# Patient Record
Sex: MALE | Race: WHITE | NOT HISPANIC OR LATINO | Employment: OTHER | URBAN - NONMETROPOLITAN AREA
[De-identification: names, ages, dates, MRNs, and addresses within clinical notes are randomized per-mention and may not be internally consistent; named-entity substitution may affect disease eponyms.]

---

## 2024-03-21 ENCOUNTER — PREP FOR PROCEDURE (OUTPATIENT)
Dept: OPERATING ROOM | Facility: HOSPITAL | Age: 64
End: 2024-03-21
Payer: MEDICARE

## 2024-03-21 DIAGNOSIS — H25.812 COMBINED FORMS OF AGE-RELATED CATARACT OF LEFT EYE: Primary | ICD-10-CM

## 2024-03-21 RX ORDER — KETOROLAC TROMETHAMINE 5 MG/ML
1 SOLUTION OPHTHALMIC
Status: CANCELLED | OUTPATIENT
Start: 2024-03-28 | End: 2024-03-28

## 2024-03-21 RX ORDER — TETRACAINE HYDROCHLORIDE 5 MG/ML
1 SOLUTION OPHTHALMIC ONCE
Status: CANCELLED | OUTPATIENT
Start: 2024-03-28 | End: 2024-03-21

## 2024-03-21 RX ORDER — POVIDONE-IODINE 5 %
SOLUTION, NON-ORAL OPHTHALMIC (EYE) ONCE
Status: CANCELLED | OUTPATIENT
Start: 2024-03-28 | End: 2024-03-21

## 2024-03-21 RX ORDER — PREDNISOLONE ACETATE 10 MG/ML
1 SUSPENSION/ DROPS OPHTHALMIC ONCE
Status: CANCELLED | OUTPATIENT
Start: 2024-03-28 | End: 2024-03-21

## 2024-03-26 RX ORDER — METHOTREXATE 2.5 MG/1
6 TABLET ORAL
COMMUNITY

## 2024-03-26 RX ORDER — ATORVASTATIN CALCIUM 40 MG/1
40 TABLET, FILM COATED ORAL DAILY
COMMUNITY

## 2024-03-26 RX ORDER — CILOSTAZOL 100 MG/1
100 TABLET ORAL 2 TIMES DAILY
COMMUNITY

## 2024-03-26 RX ORDER — GOLDENSEAL 350 MG
CAPSULE ORAL
COMMUNITY

## 2024-03-26 RX ORDER — ASPIRIN 81 MG/1
81 TABLET ORAL DAILY
COMMUNITY

## 2024-03-26 RX ORDER — LISINOPRIL AND HYDROCHLOROTHIAZIDE 12.5; 2 MG/1; MG/1
1 TABLET ORAL DAILY
COMMUNITY

## 2024-03-26 RX ORDER — FERROUS SULFATE 325(65) MG
65 TABLET, DELAYED RELEASE (ENTERIC COATED) ORAL
COMMUNITY

## 2024-03-26 RX ORDER — FOLIC ACID 1 MG/1
1 TABLET ORAL DAILY
COMMUNITY

## 2024-03-26 RX ORDER — TRAMADOL HYDROCHLORIDE 50 MG/1
50 TABLET ORAL 2 TIMES DAILY PRN
COMMUNITY

## 2024-03-26 NOTE — PREPROCEDURE INSTRUCTIONS
No outpatient medications have been marked as taking for the 3/28/24 encounter (Hospital Encounter).                       NPO Instructions:    Nothing to eat or drink after midnight    Additional Instructions:     Will need  home, will receive call day before surgery with arrival time

## 2024-03-28 ENCOUNTER — ANESTHESIA (OUTPATIENT)
Dept: OPERATING ROOM | Facility: HOSPITAL | Age: 64
End: 2024-03-28
Payer: MEDICARE

## 2024-03-28 ENCOUNTER — ANESTHESIA EVENT (OUTPATIENT)
Dept: OPERATING ROOM | Facility: HOSPITAL | Age: 64
End: 2024-03-28
Payer: MEDICARE

## 2024-03-28 ENCOUNTER — HOSPITAL ENCOUNTER (OUTPATIENT)
Facility: HOSPITAL | Age: 64
Setting detail: OUTPATIENT SURGERY
Discharge: HOME | End: 2024-03-28
Attending: OPHTHALMOLOGY | Admitting: OPHTHALMOLOGY
Payer: MEDICARE

## 2024-03-28 VITALS
OXYGEN SATURATION: 100 % | RESPIRATION RATE: 18 BRPM | SYSTOLIC BLOOD PRESSURE: 129 MMHG | WEIGHT: 156.09 LBS | HEART RATE: 66 BPM | HEIGHT: 69 IN | DIASTOLIC BLOOD PRESSURE: 80 MMHG | BODY MASS INDEX: 23.12 KG/M2 | TEMPERATURE: 97.6 F

## 2024-03-28 PROCEDURE — 2500000004 HC RX 250 GENERAL PHARMACY W/ HCPCS (ALT 636 FOR OP/ED): Performed by: ANESTHESIOLOGY

## 2024-03-28 PROCEDURE — 2500000005 HC RX 250 GENERAL PHARMACY W/O HCPCS: Performed by: OPHTHALMOLOGY

## 2024-03-28 PROCEDURE — 7100000010 HC PHASE TWO TIME - EACH INCREMENTAL 1 MINUTE: Performed by: OPHTHALMOLOGY

## 2024-03-28 PROCEDURE — 3600000008 HC OR TIME - EACH INCREMENTAL 1 MINUTE - PROCEDURE LEVEL THREE: Performed by: OPHTHALMOLOGY

## 2024-03-28 PROCEDURE — 2500000001 HC RX 250 WO HCPCS SELF ADMINISTERED DRUGS (ALT 637 FOR MEDICARE OP): Performed by: OPHTHALMOLOGY

## 2024-03-28 PROCEDURE — 2760000001 HC OR 276 NO HCPCS: Performed by: OPHTHALMOLOGY

## 2024-03-28 PROCEDURE — 2500000004 HC RX 250 GENERAL PHARMACY W/ HCPCS (ALT 636 FOR OP/ED): Performed by: OPHTHALMOLOGY

## 2024-03-28 PROCEDURE — 3700000001 HC GENERAL ANESTHESIA TIME - INITIAL BASE CHARGE: Performed by: OPHTHALMOLOGY

## 2024-03-28 PROCEDURE — 3600000003 HC OR TIME - INITIAL BASE CHARGE - PROCEDURE LEVEL THREE: Performed by: OPHTHALMOLOGY

## 2024-03-28 PROCEDURE — 7100000009 HC PHASE TWO TIME - INITIAL BASE CHARGE: Performed by: OPHTHALMOLOGY

## 2024-03-28 PROCEDURE — 3700000002 HC GENERAL ANESTHESIA TIME - EACH INCREMENTAL 1 MINUTE: Performed by: OPHTHALMOLOGY

## 2024-03-28 PROCEDURE — C1780 LENS, INTRAOCULAR (NEW TECH): HCPCS | Performed by: OPHTHALMOLOGY

## 2024-03-28 PROCEDURE — 2720000007 HC OR 272 NO HCPCS: Performed by: OPHTHALMOLOGY

## 2024-03-28 DEVICE — IMPLANTABLE DEVICE: Type: IMPLANTABLE DEVICE | Site: EYE | Status: FUNCTIONAL

## 2024-03-28 RX ORDER — MIDAZOLAM HYDROCHLORIDE 1 MG/ML
2 INJECTION INTRAMUSCULAR; INTRAVENOUS ONCE AS NEEDED
Status: COMPLETED | OUTPATIENT
Start: 2024-03-28 | End: 2024-03-28

## 2024-03-28 RX ORDER — POVIDONE-IODINE 5 %
SOLUTION, NON-ORAL OPHTHALMIC (EYE) ONCE
Status: COMPLETED | OUTPATIENT
Start: 2024-03-28 | End: 2024-03-28

## 2024-03-28 RX ORDER — TETRACAINE HYDROCHLORIDE 5 MG/ML
1 SOLUTION OPHTHALMIC ONCE
Status: COMPLETED | OUTPATIENT
Start: 2024-03-28 | End: 2024-03-28

## 2024-03-28 RX ORDER — KETOROLAC TROMETHAMINE 5 MG/ML
1 SOLUTION OPHTHALMIC
Status: COMPLETED | OUTPATIENT
Start: 2024-03-28 | End: 2024-03-28

## 2024-03-28 RX ORDER — PREDNISOLONE ACETATE 10 MG/ML
1 SUSPENSION/ DROPS OPHTHALMIC ONCE
Status: COMPLETED | OUTPATIENT
Start: 2024-03-28 | End: 2024-03-28

## 2024-03-28 RX ORDER — MIDAZOLAM HYDROCHLORIDE 1 MG/ML
INJECTION INTRAMUSCULAR; INTRAVENOUS AS NEEDED
Status: DISCONTINUED | OUTPATIENT
Start: 2024-03-28 | End: 2024-03-28

## 2024-03-28 RX ORDER — SODIUM CHLORIDE, SODIUM LACTATE, POTASSIUM CHLORIDE, CALCIUM CHLORIDE 600; 310; 30; 20 MG/100ML; MG/100ML; MG/100ML; MG/100ML
100 INJECTION, SOLUTION INTRAVENOUS CONTINUOUS
Status: DISCONTINUED | OUTPATIENT
Start: 2024-03-28 | End: 2024-03-28 | Stop reason: HOSPADM

## 2024-03-28 RX ORDER — MOXIFLOXACIN 5 MG/ML
SOLUTION/ DROPS OPHTHALMIC AS NEEDED
Status: DISCONTINUED | OUTPATIENT
Start: 2024-03-28 | End: 2024-03-28 | Stop reason: HOSPADM

## 2024-03-28 RX ADMIN — PHENYLEPHRINE HYDROCHLORIDE 1 DROP: 100 SOLUTION/ DROPS OPHTHALMIC at 11:20

## 2024-03-28 RX ADMIN — POLYVINYL ALCOHOL, POVIDONE 1 DROP: 14; 6 SOLUTION/ DROPS OPHTHALMIC at 11:10

## 2024-03-28 RX ADMIN — MIDAZOLAM HYDROCHLORIDE 2 MG: 1 INJECTION, SOLUTION INTRAMUSCULAR; INTRAVENOUS at 12:39

## 2024-03-28 RX ADMIN — SODIUM CHLORIDE, POTASSIUM CHLORIDE, SODIUM LACTATE AND CALCIUM CHLORIDE 100 ML/HR: 600; 310; 30; 20 INJECTION, SOLUTION INTRAVENOUS at 11:09

## 2024-03-28 RX ADMIN — KETOROLAC TROMETHAMINE 1 DROP: 5 SOLUTION/ DROPS OPHTHALMIC at 11:37

## 2024-03-28 RX ADMIN — KETOROLAC TROMETHAMINE 1 DROP: 5 SOLUTION/ DROPS OPHTHALMIC at 11:10

## 2024-03-28 RX ADMIN — POVIDONE-IODINE: 5 SOLUTION OPHTHALMIC at 11:09

## 2024-03-28 RX ADMIN — MIDAZOLAM HYDROCHLORIDE 1 MG: 1 INJECTION, SOLUTION INTRAMUSCULAR; INTRAVENOUS at 12:48

## 2024-03-28 RX ADMIN — POLYVINYL ALCOHOL, POVIDONE 1 DROP: 14; 6 SOLUTION/ DROPS OPHTHALMIC at 11:20

## 2024-03-28 RX ADMIN — TETRACAINE HYDROCHLORIDE 1 DROP: 5 SOLUTION OPHTHALMIC at 11:08

## 2024-03-28 RX ADMIN — KETOROLAC TROMETHAMINE 1 DROP: 5 SOLUTION/ DROPS OPHTHALMIC at 11:20

## 2024-03-28 RX ADMIN — PHENYLEPHRINE HYDROCHLORIDE 1 DROP: 100 SOLUTION/ DROPS OPHTHALMIC at 11:25

## 2024-03-28 RX ADMIN — KETOROLAC TROMETHAMINE 1 DROP: 5 SOLUTION/ DROPS OPHTHALMIC at 11:26

## 2024-03-28 RX ADMIN — PHENYLEPHRINE HYDROCHLORIDE 1 DROP: 100 SOLUTION/ DROPS OPHTHALMIC at 11:10

## 2024-03-28 RX ADMIN — PHENYLEPHRINE HYDROCHLORIDE 1 DROP: 100 SOLUTION/ DROPS OPHTHALMIC at 11:37

## 2024-03-28 RX ADMIN — MIDAZOLAM HYDROCHLORIDE 1 MG: 1 INJECTION, SOLUTION INTRAMUSCULAR; INTRAVENOUS at 12:55

## 2024-03-28 RX ADMIN — POLYVINYL ALCOHOL, POVIDONE 1 DROP: 14; 6 SOLUTION/ DROPS OPHTHALMIC at 11:37

## 2024-03-28 RX ADMIN — POLYVINYL ALCOHOL, POVIDONE 1 DROP: 14; 6 SOLUTION/ DROPS OPHTHALMIC at 11:26

## 2024-03-28 SDOH — HEALTH STABILITY: MENTAL HEALTH: CURRENT SMOKER: 0

## 2024-03-28 ASSESSMENT — PAIN - FUNCTIONAL ASSESSMENT
PAIN_FUNCTIONAL_ASSESSMENT: 0-10

## 2024-03-28 ASSESSMENT — PAIN SCALES - GENERAL
PAIN_LEVEL: 0
PAINLEVEL_OUTOF10: 0 - NO PAIN
PAINLEVEL_OUTOF10: 5 - MODERATE PAIN
PAINLEVEL_OUTOF10: 0 - NO PAIN

## 2024-03-28 ASSESSMENT — COLUMBIA-SUICIDE SEVERITY RATING SCALE - C-SSRS
6. HAVE YOU EVER DONE ANYTHING, STARTED TO DO ANYTHING, OR PREPARED TO DO ANYTHING TO END YOUR LIFE?: NO
1. IN THE PAST MONTH, HAVE YOU WISHED YOU WERE DEAD OR WISHED YOU COULD GO TO SLEEP AND NOT WAKE UP?: NO
2. HAVE YOU ACTUALLY HAD ANY THOUGHTS OF KILLING YOURSELF?: NO

## 2024-03-28 NOTE — ANESTHESIA PREPROCEDURE EVALUATION
Patient: Edgar Asif    Procedure Information       Date/Time: 03/28/24 1130    Procedure: Phacoemulsification Cataract with Insertion Intraocular Lens (Left: Eye)    Location: [facility] OR 05 / [facility] OR    Surgeons: Jhon López MD            Relevant Problems   Anesthesia (within normal limits)   (-) Difficult intubation   (-) Malignant hyperthermia   (-) PONV (postoperative nausea and vomiting)      Cardiac (within normal limits)      Pulmonary (within normal limits)      Neuro (within normal limits)      GI (within normal limits)      /Renal (within normal limits)      Liver (within normal limits)      Endocrine (within normal limits)      Hematology (within normal limits)      Musculoskeletal (within normal limits)      HEENT (within normal limits)      ID (within normal limits)      Skin (within normal limits)      GYN (within normal limits)       Clinical information reviewed:   Tobacco  Allergies  Meds   Med Hx  Surg Hx   Fam Hx  Soc Hx        NPO Detail:  NPO/Void Status  Carbohydrate Drink Given Prior to Surgery? : N  Date of Last Liquid: 03/27/24  Time of Last Liquid: 2200  Date of Last Solid: 03/27/24  Time of Last Solid: 1800  Last Intake Type: Clear fluids  Time of Last Void: 1047         Physical Exam    Airway  Mallampati: II  TM distance: >3 FB  Neck ROM: full     Cardiovascular   Rhythm: regular  Rate: normal     Dental - normal exam  (+) upper dentures     Pulmonary   Breath sounds clear to auscultation     Abdominal      Other findings: Lower remaining teeth intact          Anesthesia Plan    History of general anesthesia?: yes  History of complications of general anesthesia?: no    ASA 3     MAC     The patient is not a current smoker.    intravenous induction   Anesthetic plan and risks discussed with patient.

## 2024-03-28 NOTE — ANESTHESIA POSTPROCEDURE EVALUATION
Patient: Edgar Asif    Procedure Summary       Date: 03/28/24 Room / Location: [facility] OR 05 / [facility] OR    Anesthesia Start: 1240 Anesthesia Stop:     Procedure: Phacoemulsification Cataract with Insertion Intraocular Lens (Left: Eye) Diagnosis:       Combined forms of age-related cataract of left eye      (Combined forms of age-related cataract of left eye [H25.812])    Surgeons: Jhon López MD Responsible Provider: Hao Youssef MD    Anesthesia Type: MAC ASA Status: 3            Anesthesia Type: MAC    Vitals Value Taken Time   BP  03/28/24 1259   Temp  03/28/24 1259   Pulse 68 03/28/24 1259   Resp 12 03/28/24 1259   SpO2 99 03/28/24 1259       Anesthesia Post Evaluation    Patient location during evaluation: PACU  Patient participation: complete - patient participated  Level of consciousness: awake  Pain score: 0  Pain management: adequate  Airway patency: patent  Cardiovascular status: acceptable  Respiratory status: acceptable  Hydration status: acceptable  Postoperative Nausea and Vomiting: none        There were no known notable events for this encounter.

## 2024-03-28 NOTE — H&P
H&P Notes  - documented in this encounter  Jhon López MD - 03/22/2024 4:18 PM EDT  Formatting of this note is different from the original.  HISTORY AND PHYSICAL EXAMINATION    SERVICE DATE: 3/22/2024  SERVICE TIME:    PRIMARY CARE PHYSICIAN: No primary care provider on file.    REASON FOR VISIT:  Edgar Asif is a 64 year old male who is being seen for combined age related cataract left eye    The patient has the following:  ACTIVE PROBLEM LIST  Normochromic Normocytic Anemia  Thrombocytosis  Unintentional Weight Loss  Severe Protein-Calorie Malnutrition (Hcc)  Bilateral Carotid Artery Stenosis  Cva (Cerebral Vascular Accident) (Hcc)  Degenerative Joint Disease of Low Back  Pad (Peripheral Artery Disease) (Hcc)  Occlusion of Right Iliac Artery (Hcc)  Vision Changes  Rheumatoid Arthritis Involving Multiple Sites With Positive Rheumatoid Factor (Hcc)  Cavitary Lesion of Lung  Persistent Proteinuria  Peripheral Arterial Disease (Hcc)  Adenopathy  Abnormal Mri of The Head  Pachymeningitis  Lupus Anticoagulant Positive  Vasculitis (Hcc)  Igg4 Related Disease (Hcc)  Lung Nodule  Peripheral Ulcerative Keratitis of Both Eyes  Combined Forms of Age-Related Cataract of Right Eye  Combined Form of Age-Related Cataract, Left Eye   (Central Serous Retinopathy), Bilateral  Essential Hypertension, Benign  Punctate Keratitis, Bilateral  Dry Eyes, Bilateral  Lymphadenopathy    SUBJECTIVE  CHIEF COMPLAINT: Combined age related cataract left eye  HPI: Blurry vision, difficulty reading, difficulty watching television both eyes    PAST MEDICAL HISTORY  Diagnosis Date  Essential hypertension, benign  History of anxiety  History of bladder cancer  IgG4 related disease (HCC)  IgG4-related pachymeningitis (HCC)  LLL Cavitary lung nodule  Mixed hyperlipidemia  Nuclear sclerosis of both eyes  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  Seropositive erosive rheumatoid arthritis    PAST SURGICAL HISTORY  Procedure Laterality Date  BLADDER SURGERY  HX   LUMBAR PUNCTURE PROCEDURE (W NOTE) 2023    FAMILY HISTORY  Problem Relation Age of Onset  Heart disease Father  Heart disease Mother  Hypertension Mother  other (rheumatoid arthritis) Mother  other (low back pain) Sister  Arthritis Sister  other (drug use) Son  Cataract No Family History  Glaucoma No Family History  Detached Retina No Family History  Macular Degen No Family History  Blindness No Family History  Amblyopia No Family History  Strabismus No Family History  No Ocular Disease No Family History    SOCIAL HISTORY:  Social History    Tobacco Use  Smoking status: Former  Packs/day: 1.00  Years: 30.00  Additional pack years: 0.00  Total pack years: 30.00  Types: Cigarettes  Quit date: 3/19/2017  Years since quittin.0  Smokeless tobacco: Never  Vaping Use  Vaping Use: Never used  Substance Use Topics  Alcohol use: Yes  Drug use: Not Currently  Types: Marijuana    MEDICATIONS:  Prior to Admission medications as of 3/22/24 1615  Medication Sig Last Dose Taking  traMADol (ULTRAM) 50 mg tablet Take 1 tablet by mouth every 12 hours as needed for pain for up to 30 days. for pain. Yes  varenicline (TYRVAYA) 0.03 mg/spray nasal spray Use 1 Spray in each nostril every 12 hours. Yes  lisinopril-hydroCHLOROthiazide (ZESTORETIC) 20-12.5 mg per tablet Take 1 tablet by mouth once daily. Patient is taking 1/2 pill Yes  methotrexate 2.5 mg tablet Take 8 tablets by mouth one time a week. Take in split dose - 4 tabs morning and 4 tabs evening ONE day per week. Yes  folic acid 1 mg tablet Take 1 tablet by mouth once daily. Yes  pantoprazole DR (PROTONIX) 40 mg tablet Take 1 tablet by mouth daily at 6 am. Yes  atorvastatin (LIPITOR) 40 mg tablet Take 1 tablet by mouth once daily. Yes  sulfamethoxazole-trimethoprim (BACTRIM DS) 800-160 mg per tablet Take 1 tablet by mouth every Monday, Wednesday, and Friday. Yes  carboxymethylcellulose (REFRESH TEARS) 0.5 % drop Use 1 Drop in both eyes four times daily. Yes  zinc  sulfate 220 mg (50 mg zinc) capsule Take 1 capsule by mouth once daily. Yes  MEDICATION, NON-DATABASE Consult to Physical therapy: Please evaluate and treat Yes  naloxone 4 mg/actuation nasal spray (NARCAN) Use 1 spray in one nostril as needed for overdose. May repeat every 2 to 3 min in alternating nostrils until medical assistance is available Yes  aspirin, enteric coated (ASPIRIN, ENTERIC COATED) 81 mg EC tablet Take 1 tablet by mouth once daily. Yes  cilostazol (PLETAL) 100 mg tablet Take 1 tablet by mouth twice daily. Yes  ferrous sulfate 325 mg (65 mg iron) tablet Take 1 tablet by mouth once daily. Yes    No medication comments found.    CURRENT ALLERGIES: ALLERGIES  No Known Allergies    REVIEW OF SYSTEMS:  PAIN ASSESSMENT:  General: No weight loss, malaise or fevers.  Neuro: No Hx of stroke or seizures  Respiratory: No history of current cough or dyspnea, or pneumonia in the past 6 weeks. No history of respiratory/pulmonary symptoms or problems  Cardiovascular: Positive for: Hypertension  GI: No history of GI symptoms or problems. No history of esophageal varices, recent ascites, or ETOH greater than 2 drinks per day.  : No history of UTI in past 6 weeks. No history of renal failure. Not currently on or requiring dialysis. No history of  symptoms or problems.  GYN: Negative for abnormal vaginal bleeding, abnormal vaginal discharge., N/A  Pregnancy: N/A  Endocrine: No history of diabetes. Has not taken steroids within the past 30 days. No history of endocrinological symptoms or problems.  Hematology: No history of bleeding or clotting disorder. Pt is not taking anti-coagulation or platelet medications. No history of hematological symptoms or problems.  Oncology: No history of CA metastasis, chemo within 30 days, or radiotherapy within 90 days. Has not lost 10% of body wt in 6 months. No history of oncological symptoms or problems.  Psych: No history of psychiatric symptoms or problems.  Musculoskeletal:  Rheumatoid arthritis/Inflammatory arthritis  Skin: Negative for lesions, rash and itching.    PHYSICAL EXAM:  VITALS:  /64  Pulse 75        General: Alert and oriented  Skin: Normal color, no rash, no lesions.  HEENT: EOM, pupils equal, round and reactive.  Cardiovascular: Normal S1 & S2, no rubs, murmurs or gallops. No JVD. Pulse regular.  Lungs: Normal breath sounds, no wheezes or crackles.  Abdomen: Soft, non-tender, no rigidity.  Extremities: No deformity, no edema or tenderness, no joint swelling or clubbing.  Neurological: Normal cognition and motor skills.  Pulses: Carotid and radial pulses normal +2.    Diagnostic tests reviewed for today's visit:  No new labs or tests    ASSESSMENT  Medication and Non-Pharmacologic VTE Prophylaxis/Anticoagulants      VTE Prophylaxis: VTE prophylaxis appropriate    Impression: There is no known pertinent medical condition which may affect michael-operative course    Clinical Risk Factors for Possible Cardiac Complications:  None    Patient is scheduled for a low-risk procedure.    FUNCTIONAL STATUS: Walk indoors, such as around the house (1.75 METs)    Functional Class (NYHA): N/A    [facility]: Not obtained    PLAN  CONSULTS:  Patient does not require consults for optimization at this time.    The Following Tests/Procedures Have Been Initiated:  None    Instructions Given to Patient:  Patient given verbal and written preop instructions and voices comprehension and compliance.    SIGNATURE: Jhon López MD PATIENT NAME: Edgar Asif  DATE: March 22, 2024 MRN: [id number]  TIME: 4:18 PM PAGER/CONTACT #:    Electronically signed by Jhon López MD at 03/22/2024 4:25 PM EDT   Source Comments  - [facility]  In the event this information is protected by the Federal Confidentiality of Alcohol and Drug Abuse Patient Records regulations: This information has been disclosed to you from records protected by Federal confidentiality rules (42 CFR Part 2). The Federal  rules prohibit you from making any further disclosure of this information unless further disclosure is expressly permitted by the written consent of the person to whom it pertains or as otherwise permitted by 42 CFR Part 2. A general authorization for the release of medical or other information is NOT sufficient for this purpose. The Federal rules restrict any use of the information to criminally investigate or prosecute any alcohol or drug abuse patient.  Reason for Visit    Reason for Visit -  Reason Comments   Cataract Follow Up       Encounter Details    Encounter Details  Date Type Department Care Team (Latest Contact Info) Description   03/22/2024 3:45 PM EDT Office Visit [facility] Ophthalmology   [address] [id number]   [phone]  Jhon López MD   [address] [place] [address]   [phone] (Work)   [phone] (Fax)  Combined form of age-related cataract, left eye (Primary Dx);  Combined forms of age-related cataract of right eye;  Punctate keratitis, bilateral;  Dry eyes, bilateral;  Rheumatoid arthritis involving multiple sites with positive rheumatoid factor (HCC);  Essential hypertension, benign;  Lymphadenopathy;  Normochromic normocytic anemia     Social History  - documented as of this encounter  Social History  Tobacco Use Types Packs/Day Years Used Date   Smoking Tobacco: Former Cigarettes 1 30 Quit: 03/19/2017   Smokeless Tobacco: Never             Social History  Alcohol Use Standard Drinks/Week Comments   Yes 0 (1 standard drink = 0.6 oz pure alcohol)       Social History  PHQ-2 Answer Date Recorded   PHQ-2 score 1 04/27/2023     Social History  Sex and Gender Information Value Date Recorded   Sex Assigned at Birth Not on file     Gender Identity Not on file     Sexual Orientation Not on file       Last Filed Vital Signs  - documented in this encounter  Last Filed Vital Signs  Vital Sign Reading Time Taken Comments   Blood Pressure 100/64 03/22/2024 4:12 PM EDT      Pulse 75 03/22/2024 4:12 PM EDT     Temperature - -     Respiratory Rate - -     Oxygen Saturation - -     Inhaled Oxygen Concentration - -     Weight - -     Height - -     Body Mass Index - -       Functional Status  - documented as of this encounter  Functional Status  Functional Status Response Date of Assessment   Are you deaf or do you have serious difficulty hearing? No 09/22/2023   Are you blind or do you have serious difficulty seeing, even when wearing glasses? No 09/22/2023   Do you have serious difficulty walking or climbing stairs? No 09/22/2023   Do you have difficulty dressing or bathing? No 09/22/2023   Because of a physical, mental, or emotional condition, do you have difficulty doing errands alone such as visiting a doctor's office or shopping? No 09/22/2023     Functional Status  Cognitive Status Response Date of Assessment   Because of a physical, mental, or emotional condition, do you have serious difficulty concentrating, remembering, or making decisions? No 09/22/2023     Patient Instructions  - documented in this encounter  Patient Instructions  Jhon López MD - 03/22/2024 4:18 PM EDT   Formatting of this note might be different from the original.      Continue:  Tyrvaya 1 spray in both nostrils twice daily  Refresh tears 1 drop in both eyes four times daily  Genteal ointment at bedtime both eyes    Surgery is scheduled for 03/28/2024 left eye at [facility]    If you have any questions please contact our office at [phone].  After office hours or on the weekend, please call Dr. López on his cell phone at [phone].  Electronically signed by Jhon López MD at 03/22/2024 4:18 PM EDT     Progress Notes  - documented in this encounter  Jhon López MD - 03/22/2024 4:15 PM EDT  Formatting of this note might be different from the original.  ASSESSMENT/PLAN:  1. Combined form of age-related cataract, left eye - ICD9: 366.19, ICD10:  H25.812 (primary diagnosis)    PHYSICAL EXAM:    Vital Signs:  Blood pressure 100/64, pulse 75.    Respiratory:  Normal breath sounds, no wheezing.    CARD:  Normal heart sounds 1 & 2, normal sinus rhythm.      Cataract Presurgical Documentation    Cataract: Right eye (OD) then Left eye (OS)    Current Visual Acuity  Right Eye Distance CC 20/50  Left Eye Distance CC 20/200      Visual Function: Edgar Asif states that the decline in vision from the cataract impedes his abilities as listed in the HPI, as well as other activities of daily living.    Edgar Asif has confirmed that he is no longer able to function adequately on a day-to-day basis because of his current visual condition.    Further, it is my medical opinion that the cataract is the primary cause, or at least a significantly contributory cause of his visual dysfunction. With uncomplicated cataract surgery and lens implantation, it is my expectation that his visual function and quality of life will improve, significantly.    The risks, benefits, alternatives, personnel and complications of cataract surgery with lens implantation were discussed with Edgar Asif in detail. he appeared to understand and asked that I proceed with plans for surgery.    Upon eye examination, patient was found to have a visually significant cataract left eye. Discussed cataract surgery with patient and different intraocular lens implant options with patient: basic monofocal intraocular lens implant, Toric intraocular lens implant, and presbyopia correction intraocular lens implant. In my medical opinion, based on medical history and ocular examination, cataract surgery with intraocular lens implant will correct patient's vision and improve quality of patient's daily living activities. Patient wishes to have traditional cataract surgery with basic intraocular lens left eye 03/28/2024. Patient wishes to have cataract surgery with the option stated above. Patient understands that  an intraocular lens implant does not necessarily replace the need for glasses. Patient understands that it is impossible for the surgeon to inform him/her of every possible complication that may occur. The surgeon has answered all of the patient's questions. Patient understands that if he/she has a mature or dense cataract, pseudoexfoliation cataract, or history of use of Flomax, he/she may require the use of Maluyugin Ring and/or Vision Blue during surgery. Patient understands the risks, benefits, and alternatives to surgery.    Patient understands the need for glasses for all near and intermediate vision including reading and computer work. He/she declines monovision. He/she was offered a Toric Intraocular lens to correct astigmatism, but he/she declines the Toric Intraocular lens. Patient understands that he/she will need glasses to correct residual astigmatism at all distances after cataract surgery.    2. Combined forms of age-related cataract of right eye - ICD9: 366.19, ICD10: H25.811    Plan surgery right eye once left eye is stable    3. Punctate keratitis, bilateral - ICD9: 370.21, ICD10: H16.143  4. Dry eyes, bilateral - ICD9: 375.15, ICD10: H04.123    History of PUK both eyes  Punctal plug left eye 03/06/2024    Continue:  Tyrvaya 1 spray in both nostrils twice daily  Refresh tears 1 drop in both eyes four times daily  Genteal ointment at bedtime both eyes    5. Rheumatoid arthritis involving multiple sites with positive rheumatoid factor (HCC) - ICD9: 714.0, ICD10: M05.79    Under the care Dr. Srinath Dawson/Rheumatology    6. Essential hypertension, benign - ICD9: 401.1, ICD10: I10    Under the care of Dr. Epps/Cardiology    7. Lymphadenopathy - ICD9: 785.6, ICD10: R59.1  8. Normochromic normocytic anemia - ICD9: 285.9, ICD10: D64.9    Continue care with Dr. Marie    I have confirmed and edited as necessary the relevant HPI, ophthalmic history, ROS, and the neuro exam findings as obtained by others. I  have seen and examined Edgar Asif.  I have discussed the case and the management of this patient's care with the Resident/Fellow, if applicable. I also have reviewed and agree with the assessment and plan as stated above and agree with all of its relevant components.      Jhon López MD  Electronically signed by Jhon López MD at 03/22/2024 4:25 PM EDT   Plan of Treatment  - documented as of this encounter  Plan of Treatment - Upcoming Encounters  Upcoming Encounters  Date Type Department Care Team (Latest Contact Info) Description   03/28/2024 9:20 AM EDT Hospital Encounter   Jhon López MD   [address] [place] [address]   [phone] (Work)   [phone] (Fax)  Combined form of age-related cataract, left eye [H25.812]   03/28/2024 9:20 AM EDT - 03/28/2024 9:35 AM EDT Surgery [address] [address]  Jhon López MD   [address] [place] [address]   [phone] (Work)   [phone] (Fax)  SURGERY AT NON-[facility] FACILITY   03/29/2024 10:45 AM EDT Office Visit [facility] Ophthalmology   [address] [place] [address]   [phone]  Jhon López MD   [address] [place] [address]   [phone] (Work)   [phone] (Fax)  1 DAY PO 1ST LE   04/24/2024 11:00 AM EDT [facility] Rheumatology   [address] [address]   [phone]  Srinath Dawson, DO   [address] [place] [address] [phone] (Work)   [phone] (Fax)  3 month telephone follow-up for IgG4-RD, RA, PUK   07/17/2024 12:00 PM EDT Results Only [facility]   [address] [place] [address]   [phone]    labs, start iv   07/17/2024 12:30 PM EDT Appointment Radiology Pet CT   [address] [place] [address]   [phone]    ct chest wo ivcon   07/22/2024 1:45 PM EDT Visit (SP) Office Hematology/Oncology   [address] [place] [address]   [phone]  Pascual Marie   [address]  [place] [address]   [phone] (Work)   [phone] (Fax)  6 mon - scan results     Plan of Treatment - Scheduled Procedures  Scheduled Procedures  Name Priority Associated Diagnoses Date/Time   SURGERY AT NON-[facility] FACILITY   Combined form of age-related cataract, left eye  03/28/2024 9:20 AM EDT     Procedures  - documented in this encounter  Procedures  Procedure Name Priority Date/Time Associated Diagnosis Comments   IOL BIOMETRY W/ IOL CALC OU (BOTH EYES) Routine 03/22/2024 4:25 PM EDT Combined form of age-related cataract, left eye   Combined forms of age-related cataract of right eye  Results for this procedure are in the results section.      Imaging Results  - documented in this encounter  IOL BIOMETRY W/ IOL CALC OU (BOTH EYES) (03/22/2024 4:25 PM EDT)  Imaging Results - IOL BIOMETRY W/ IOL CALC OU (BOTH EYES) (03/22/2024 4:25 PM EDT)  Anatomical Region Laterality Modality       Other     Imaging Results - IOL BIOMETRY W/ IOL CALC OU (BOTH EYES) (03/22/2024 4:25 PM EDT)  Narrative   03/22/2024 4:25 PM EDT   Date of Procedure  3/22/2024.    Notes  Measurements only - see Procedure Record under Scanned Documents for  signed results.    LENSTAR  Right eye:  AL 24.11 ACD 3.36 WTW 11.95  Left eye:     AL 23.76 ACD 3.45 WTW 12.11       Imaging Results - IOL BIOMETRY W/ IOL CALC OU (BOTH EYES) (03/22/2024 4:25 PM EDT)  Authorizing Provider Result Type   Jhon López MD OPHTHALMOLOGY     Associated Images       3/22/2024  IOL BIOMETRY W/ IOL CALC OU (BOTH EYES)     Visit Diagnoses  - documented in this encounter  Visit Diagnoses  Diagnosis   Combined form of age-related cataract, left eye - Primary    Combined forms of age-related cataract of right eye   Other and combined forms of senile cataract    Punctate keratitis, bilateral    Dry eyes, bilateral   Tear film insufficiency, unspecified    Rheumatoid arthritis involving multiple sites with positive rheumatoid factor (HCC)    Essential  hypertension, benign    Lymphadenopathy   Enlargement of lymph nodes    Normochromic normocytic anemia   Anemia, unspecified    Combined form of age-related cataract, left eye      Eye Exam    Eye Exam - Visual Acuity (Snellen - Linear)  Visual Acuity (Snellen - Linear)    Right eye Left eye   Dist cc 20/50 20/200   Near cc J6 J6     Eye Exam - Tonometry (Applanation, 4:25 PM)  Tonometry (Applanation, 4:25 PM)    Right eye Left eye   Pressure 15 15     Eye Exam - Pupils  Pupils    Dark Shape React APD   Right eye 4 Round Minimal None   Left eye 3 Round Minimal None     Eye Exam - Visual Fields  Visual Fields    Right eye Left eye     Full Full     Eye Exam - Extraocular Movement  Extraocular Movement    Right eye Left eye     Full Full     Eye Exam - Neuro/Psych  Neuro/Psych  Oriented x3: Yes   Mood/Affect: Normal     Eye Exam - External Exam  External Exam    Right eye Left eye   External Normal including orbits and preauricular lymph nodes Normal including orbits and preauricular lymph nodes     Eye Exam - Slit Lamp Exam  Slit Lamp Exam    Right eye Left eye   Lids/Lashes Normal lids, lashes, lacrimal glands, and lacrimal drainage Normal lids, lashes, lacrimal glands, and lacrimal drainage   Conjunctiva/Sclera Hyperemia Hyperemia   Cornea Thinning in periphery Thinning in periphery   Anterior Chamber Deep and quiet Deep and quiet   Iris Round and reactive Round and reactive   Lens 4(dense)+ Nuclear sclerotic cataract, (dense) 4+ Posterior subcapsular cataract 4(dense)+ Nuclear sclerotic cataract, (dense) 4+ Posterior subcapsular cataract   Anterior Vitreous Normal Normal     Eye Exam - Fundus Exam  Fundus Exam    Right eye Left eye   Disc Normal Normal   C/D Ratio 0.25 0.25   Macula Normal Normal   Vessels Normal Normal   Periphery Normal Normal     Eye Exam - Wearing Rx  Wearing Rx    Sphere Cylinder Axis Add   Right eye -1.25 +1.75 168 +2.50   Left eye -1.75 +0.75 073 +2.50     Care Teams  - documented as of  this encounter  Care Teams  Team Member Relationship Specialty Start Date End Date   Mike Mckinney DO   NPI: [id number]   [address] [place] [address]   [phone] (Work)   [phone] (Fax)  Referring General Surgery 3/28/23     Thania Stevens LISW   [address] [place] [address]   [phone] (Work)   Hematology/Oncology 7/21/23     Kwabena De La Cruz   NPI: [id number]   [address] [place] [id number] [phone] (Work)   [phone] (Fax)    Optometry 1/3/24

## 2024-03-28 NOTE — OP NOTE
Phacoemulsification Cataract with Insertion Intraocular Lens (L) Operative Note     Date: 3/28/2024  OR Location: [facility] OR    Name: Edgar Asif, : 1960, Age: 64 y.o., MRN: [id number], Sex: male    Diagnosis  Pre-op Diagnosis     * Combined forms of age-related cataract of left eye [H25.812] Post-op Diagnosis     * Combined forms of age-related cataract of left eye [H25.812]     * Regular astigmatism of left eye [H52.222]     * Corneal thinning, left [H18.892]     Procedures  Phacoemulsification Cataract with Insertion Intraocular Lens  91457 - OR XCAPSL CTRC RMVL INSJ IO LENS PROSTH W/O ECP      Surgeons      * Jhon López - Primary    Resident/Fellow/Other Assistant:  Surgeon(s) and Role:    Procedure Summary  Anesthesia: Monitor Anesthesia Care  ASA: III  Anesthesia Staff: Anesthesiologist: Hao Youssef MD  Estimated Blood Loss: None  Intra-op Medications:   Administrations occurring from 1130 to 1210 on 24:   Medication Name Total Dose   ketorolac (Acular) 0.5 % ophthalmic solution 1 drop 1 drop   lubricating eye drops ophthalmic solution 1 drop 1 drop   phenylephrine-tropicamide 10 %-1 % ophthalmic solution 1 drop 1 drop     Anesthesia Record        Intraprocedure I/O Totals       None      Specimen: No specimens collected     Staff:   Circulator: Lee Ann Sparrow RN  Scrub Person: Alan Chavez    Drains and/or Catheters: * None in log *    Implants:  Implants       Type Name Action Serial No.      Lens LENS, INTRAOCULAR, SN60WF 24.5 - [id number] - [id number] Implanted [id number]      Findings: Combined Form Age Related Cataract Left Eye, High Astigmatism Left Eye, Peripheral Corneal Thinning Left Eye    Indications: Edgar Asif is an 64 y.o. male who is having surgery for Combined forms of age-related cataract of left eye [H25.812]. Decreased vision left eye.  Blurred vision left eye for near and distance left eye.  Difficulty seeing for reading and watching TV left eye.  Complains of glare  and halos left eye.     The patient was seen in the preoperative area. The risks, benefits, complications, treatment options, non-operative alternatives, expected recovery and outcomes were discussed with the patient. The possibilities of reaction to medication, pulmonary aspiration, injury to surrounding structures, bleeding, recurrent infection, the need for additional procedures, failure to diagnose a condition, and creating a complication requiring transfusion or operation were discussed with the patient. The patient concurred with the proposed plan, giving informed consent.  The site of surgery was properly noted/marked if necessary per policy. The patient has been actively warmed in preoperative area. Preoperative antibiotics are not indicated. Venous thrombosis prophylaxis are not indicated.    Procedure Details: The patient was correctly identified in the preop area and the operative eye was marked with a marking pen. The operative eye was dilated in the preoperative area.  The patient was then taken to the operating room where timeout was performed before starting the procedure. Combined anesthesia  with intravenous sedation and topical tetracaine eyedrops were given the left eye. The operative eye was prepped and draped in the standard sterile ophthalmic fashion in  preparation for ophthalmic surgery.  A Margarette wire speculum was then inserted between the eyelids of the left eye and the operating microscope was placed over the left eye.  A paracentesis incision was made approximately 30° away from the planned surgical incision site with the help of MVR blade.  1% lidocaine MPF with Phenylephrine 1.5% PF was injected into the anterior chamber through the paracentesis incision. A near limbal clear corneal incision was fashioned in the temporal quadrant just outside the vascular arcade and Viscoat was injected into anterior chamber to firm the eye. A bent needle cystotome was used and Utrata forceps were  utilized to create a continuous curvilinear capsulorrhexis.  BSS was injected beneath the anterior capsule to hydrodissect the nucleus from adjacent cortex and capsule.  The residual cortex were then aspirated with irrigation aspiration handpiece. The posterior capsule was then polished with the help of soft irrigation-aspiration tip.  Provisc viscoelastic was then injected into the eye to reform the anterior chamber and to open the capsular bag.  The intraocular lens implant was taken from its sterile wrapping, inspected under the surgical microscope and found to be in good condition. The intraocular lens implant +24.5D was injected into the capsule bag. The Provisc was then aspirated from the anterior chamber and from behind the intraocular lens implant.  The anterior chamber was inflated with the help of BSS to moderate tension.  The edges of the surgical incision were then hydrated with the help of BSS.  Vigamox was then injected into the anterior chamber and into the capsule bag through the paracentesis incision. The surgical wound was then inspected and found to be watertight.  The wire speculum and drapes were then removed.  Pred Forte eyedrops, Acular eyedrops and Betadine 5% sterile ophthalmic solution were instilled in the conjunctival sac.     The patient tolerated the procedure well and was taken to recovery room in stable condition.    Complications:  None; patient tolerated the procedure well.    Disposition:  Home  Condition: stable     Attending Attestation: I performed the procedure.    Jhon López  Phone Number: [phone]

## 2024-03-28 NOTE — DISCHARGE INSTRUCTIONS
Please see enclosed instructions from Dr. López regarding eye drop schedule, restrictions and use of eye shield.    Please take bag with eye drops that were given to you today as well as ALL eye drops that you are using at home with you to your appointment tomorrow at Dr. López's office.

## 2024-10-24 ENCOUNTER — PREP FOR PROCEDURE (OUTPATIENT)
Dept: OPERATING ROOM | Facility: HOSPITAL | Age: 64
End: 2024-10-24
Payer: MEDICARE

## 2024-10-24 DIAGNOSIS — H25.811 COMBINED FORMS OF AGE-RELATED CATARACT OF RIGHT EYE: Primary | ICD-10-CM

## 2024-10-24 RX ORDER — KETOROLAC TROMETHAMINE 5 MG/ML
1 SOLUTION OPHTHALMIC
OUTPATIENT
Start: 2024-10-24 | End: 2024-10-24

## 2024-10-24 RX ORDER — TETRACAINE HYDROCHLORIDE 5 MG/ML
1 SOLUTION OPHTHALMIC ONCE
OUTPATIENT
Start: 2024-10-24 | End: 2024-10-24

## 2024-10-24 RX ORDER — POVIDONE-IODINE 5 %
SOLUTION, NON-ORAL OPHTHALMIC (EYE) ONCE
OUTPATIENT
Start: 2024-10-24 | End: 2024-10-24

## 2024-10-24 RX ORDER — PREDNISOLONE ACETATE 10 MG/ML
1 SUSPENSION/ DROPS OPHTHALMIC ONCE
OUTPATIENT
Start: 2024-10-24 | End: 2024-10-24

## 2024-10-30 ENCOUNTER — ANESTHESIA EVENT (OUTPATIENT)
Dept: OPERATING ROOM | Facility: HOSPITAL | Age: 64
End: 2024-10-30
Payer: MEDICARE

## 2024-10-31 ENCOUNTER — ANESTHESIA (OUTPATIENT)
Dept: OPERATING ROOM | Facility: HOSPITAL | Age: 64
End: 2024-10-31
Payer: MEDICARE

## 2024-10-31 ENCOUNTER — HOSPITAL ENCOUNTER (OUTPATIENT)
Facility: HOSPITAL | Age: 64
Setting detail: OUTPATIENT SURGERY
Discharge: HOME | End: 2024-10-31
Attending: OPHTHALMOLOGY | Admitting: OPHTHALMOLOGY
Payer: MEDICARE

## 2024-10-31 VITALS
HEART RATE: 60 BPM | TEMPERATURE: 97.3 F | OXYGEN SATURATION: 97 % | WEIGHT: 160.05 LBS | SYSTOLIC BLOOD PRESSURE: 128 MMHG | BODY MASS INDEX: 24.26 KG/M2 | HEIGHT: 68 IN | RESPIRATION RATE: 16 BRPM | DIASTOLIC BLOOD PRESSURE: 74 MMHG

## 2024-10-31 PROBLEM — E78.5 HYPERLIPIDEMIA: Status: ACTIVE | Noted: 2024-10-31

## 2024-10-31 PROBLEM — I10 HTN (HYPERTENSION): Status: ACTIVE | Noted: 2024-10-31

## 2024-10-31 PROBLEM — F41.9 ANXIETY: Status: ACTIVE | Noted: 2024-10-31

## 2024-10-31 PROBLEM — I73.9 PERIPHERAL VASCULAR DISEASE (CMS-HCC): Status: ACTIVE | Noted: 2024-10-31

## 2024-10-31 PROBLEM — K21.9 GASTROESOPHAGEAL REFLUX DISEASE: Status: ACTIVE | Noted: 2024-10-31

## 2024-10-31 PROBLEM — M06.9 RHEUMATOID ARTHRITIS: Status: ACTIVE | Noted: 2024-10-31

## 2024-10-31 PROCEDURE — 7100000010 HC PHASE TWO TIME - EACH INCREMENTAL 1 MINUTE: Performed by: OPHTHALMOLOGY

## 2024-10-31 PROCEDURE — C1780 LENS, INTRAOCULAR (NEW TECH): HCPCS | Performed by: OPHTHALMOLOGY

## 2024-10-31 PROCEDURE — 3600000003 HC OR TIME - INITIAL BASE CHARGE - PROCEDURE LEVEL THREE: Performed by: OPHTHALMOLOGY

## 2024-10-31 PROCEDURE — 7100000009 HC PHASE TWO TIME - INITIAL BASE CHARGE: Performed by: OPHTHALMOLOGY

## 2024-10-31 PROCEDURE — 2500000005 HC RX 250 GENERAL PHARMACY W/O HCPCS: Performed by: OPHTHALMOLOGY

## 2024-10-31 PROCEDURE — 2500000004 HC RX 250 GENERAL PHARMACY W/ HCPCS (ALT 636 FOR OP/ED): Performed by: ANESTHESIOLOGY

## 2024-10-31 PROCEDURE — 2760000001 HC OR 276 NO HCPCS: Performed by: OPHTHALMOLOGY

## 2024-10-31 PROCEDURE — 3700000002 HC GENERAL ANESTHESIA TIME - EACH INCREMENTAL 1 MINUTE: Performed by: OPHTHALMOLOGY

## 2024-10-31 PROCEDURE — 2720000007 HC OR 272 NO HCPCS: Performed by: OPHTHALMOLOGY

## 2024-10-31 PROCEDURE — 3700000001 HC GENERAL ANESTHESIA TIME - INITIAL BASE CHARGE: Performed by: OPHTHALMOLOGY

## 2024-10-31 PROCEDURE — 3600000008 HC OR TIME - EACH INCREMENTAL 1 MINUTE - PROCEDURE LEVEL THREE: Performed by: OPHTHALMOLOGY

## 2024-10-31 PROCEDURE — 2500000001 HC RX 250 WO HCPCS SELF ADMINISTERED DRUGS (ALT 637 FOR MEDICARE OP): Performed by: OPHTHALMOLOGY

## 2024-10-31 DEVICE — ACRYSOF(R) IQ ASPHERIC NATURAL IOL, SINGLE-PIECE ACRYLIC FOLDABLE PCL, UV WITH BLUE LIGHTFILTER, 13.0MM LENGTH, 6.0MM ANTERIORASYMMETRIC BICONVEX OPTIC, PLANAR HAPTICS.
Type: IMPLANTABLE DEVICE | Site: EYE | Status: FUNCTIONAL
Brand: ACRYSOF®

## 2024-10-31 RX ORDER — ONDANSETRON HYDROCHLORIDE 2 MG/ML
4 INJECTION, SOLUTION INTRAVENOUS ONCE AS NEEDED
Status: DISCONTINUED | OUTPATIENT
Start: 2024-10-31 | End: 2024-10-31 | Stop reason: HOSPADM

## 2024-10-31 RX ORDER — MIDAZOLAM HYDROCHLORIDE 1 MG/ML
2 INJECTION INTRAMUSCULAR; INTRAVENOUS ONCE
Status: COMPLETED | OUTPATIENT
Start: 2024-10-31 | End: 2024-10-31

## 2024-10-31 RX ORDER — FENTANYL CITRATE 50 UG/ML
INJECTION, SOLUTION INTRAMUSCULAR; INTRAVENOUS AS NEEDED
Status: DISCONTINUED | OUTPATIENT
Start: 2024-10-31 | End: 2024-10-31

## 2024-10-31 RX ORDER — AMLODIPINE BESYLATE 5 MG/1
5 TABLET ORAL DAILY
COMMUNITY

## 2024-10-31 RX ORDER — LISINOPRIL 10 MG/1
10 TABLET ORAL DAILY
COMMUNITY

## 2024-10-31 RX ORDER — POVIDONE-IODINE 5 %
SOLUTION, NON-ORAL OPHTHALMIC (EYE) ONCE
Status: COMPLETED | OUTPATIENT
Start: 2024-10-31 | End: 2024-10-31

## 2024-10-31 RX ORDER — ONDANSETRON HYDROCHLORIDE 2 MG/ML
4 INJECTION, SOLUTION INTRAVENOUS ONCE
Status: COMPLETED | OUTPATIENT
Start: 2024-10-31 | End: 2024-10-31

## 2024-10-31 RX ORDER — TETRACAINE HYDROCHLORIDE 5 MG/ML
1 SOLUTION OPHTHALMIC ONCE
Status: COMPLETED | OUTPATIENT
Start: 2024-10-31 | End: 2024-10-31

## 2024-10-31 RX ORDER — MIDAZOLAM HYDROCHLORIDE 1 MG/ML
INJECTION INTRAMUSCULAR; INTRAVENOUS AS NEEDED
Status: DISCONTINUED | OUTPATIENT
Start: 2024-10-31 | End: 2024-10-31

## 2024-10-31 RX ORDER — KETOROLAC TROMETHAMINE 5 MG/ML
1 SOLUTION OPHTHALMIC
Status: COMPLETED | OUTPATIENT
Start: 2024-10-31 | End: 2024-10-31

## 2024-10-31 RX ADMIN — PHENYLEPHRINE HYDROCHLORIDE 1 DROP: 100 SOLUTION/ DROPS OPHTHALMIC at 07:49

## 2024-10-31 RX ADMIN — TETRACAINE HYDROCHLORIDE 1 DROP: 5 SOLUTION OPHTHALMIC at 07:32

## 2024-10-31 RX ADMIN — PHENYLEPHRINE HYDROCHLORIDE 1 DROP: 100 SOLUTION/ DROPS OPHTHALMIC at 07:42

## 2024-10-31 RX ADMIN — KETOROLAC TROMETHAMINE 1 DROP: 5 SOLUTION/ DROPS OPHTHALMIC at 07:49

## 2024-10-31 RX ADMIN — POLYVINYL ALCOHOL, POVIDONE 1 DROP: 14; 6 SOLUTION/ DROPS OPHTHALMIC at 07:33

## 2024-10-31 RX ADMIN — KETOROLAC TROMETHAMINE 1 DROP: 5 SOLUTION/ DROPS OPHTHALMIC at 07:42

## 2024-10-31 RX ADMIN — POLYVINYL ALCOHOL, POVIDONE 1 DROP: 14; 6 SOLUTION/ DROPS OPHTHALMIC at 07:49

## 2024-10-31 RX ADMIN — MIDAZOLAM HYDROCHLORIDE 2 MG: 1 INJECTION, SOLUTION INTRAMUSCULAR; INTRAVENOUS at 08:36

## 2024-10-31 RX ADMIN — POVIDONE-IODINE: 5 SOLUTION OPHTHALMIC at 07:32

## 2024-10-31 RX ADMIN — KETOROLAC TROMETHAMINE 1 DROP: 5 SOLUTION/ DROPS OPHTHALMIC at 07:32

## 2024-10-31 RX ADMIN — POLYVINYL ALCOHOL, POVIDONE 1 DROP: 14; 6 SOLUTION/ DROPS OPHTHALMIC at 07:57

## 2024-10-31 RX ADMIN — PHENYLEPHRINE HYDROCHLORIDE 1 DROP: 100 SOLUTION/ DROPS OPHTHALMIC at 07:57

## 2024-10-31 RX ADMIN — PHENYLEPHRINE HYDROCHLORIDE 1 DROP: 100 SOLUTION/ DROPS OPHTHALMIC at 07:32

## 2024-10-31 RX ADMIN — KETOROLAC TROMETHAMINE 1 DROP: 5 SOLUTION/ DROPS OPHTHALMIC at 07:57

## 2024-10-31 RX ADMIN — POLYVINYL ALCOHOL, POVIDONE 1 DROP: 14; 6 SOLUTION/ DROPS OPHTHALMIC at 07:42

## 2024-10-31 RX ADMIN — ONDANSETRON 4 MG: 2 INJECTION INTRAMUSCULAR; INTRAVENOUS at 08:18

## 2024-10-31 SDOH — HEALTH STABILITY: MENTAL HEALTH: CURRENT SMOKER: 0

## 2024-10-31 ASSESSMENT — PAIN - FUNCTIONAL ASSESSMENT
PAIN_FUNCTIONAL_ASSESSMENT: 0-10

## 2024-10-31 ASSESSMENT — COLUMBIA-SUICIDE SEVERITY RATING SCALE - C-SSRS
2. HAVE YOU ACTUALLY HAD ANY THOUGHTS OF KILLING YOURSELF?: NO
1. IN THE PAST MONTH, HAVE YOU WISHED YOU WERE DEAD OR WISHED YOU COULD GO TO SLEEP AND NOT WAKE UP?: NO

## 2024-10-31 ASSESSMENT — PAIN SCALES - GENERAL
PAINLEVEL_OUTOF10: 5 - MODERATE PAIN
PAINLEVEL_OUTOF10: 0 - NO PAIN
PAIN_LEVEL: 0
PAINLEVEL_OUTOF10: 0 - NO PAIN

## (undated) DEVICE — Device